# Patient Record
Sex: FEMALE | Race: WHITE | NOT HISPANIC OR LATINO | Employment: OTHER | URBAN - METROPOLITAN AREA
[De-identification: names, ages, dates, MRNs, and addresses within clinical notes are randomized per-mention and may not be internally consistent; named-entity substitution may affect disease eponyms.]

---

## 2024-05-30 ENCOUNTER — APPOINTMENT (OUTPATIENT)
Dept: CT IMAGING | Facility: HOSPITAL | Age: 79
End: 2024-05-30
Attending: EMERGENCY MEDICINE
Payer: MEDICARE

## 2024-05-30 ENCOUNTER — HOSPITAL ENCOUNTER (EMERGENCY)
Facility: HOSPITAL | Age: 79
Discharge: HOME | End: 2024-05-30
Attending: EMERGENCY MEDICINE | Admitting: EMERGENCY MEDICINE
Payer: MEDICARE

## 2024-05-30 VITALS
TEMPERATURE: 97.7 F | WEIGHT: 127.43 LBS | RESPIRATION RATE: 15 BRPM | DIASTOLIC BLOOD PRESSURE: 79 MMHG | SYSTOLIC BLOOD PRESSURE: 127 MMHG | HEART RATE: 59 BPM | OXYGEN SATURATION: 97 %

## 2024-05-30 DIAGNOSIS — E87.1 HYPONATREMIA: ICD-10-CM

## 2024-05-30 DIAGNOSIS — I48.92 PAROXYSMAL ATRIAL FLUTTER (H): ICD-10-CM

## 2024-05-30 LAB
ALBUMIN SERPL BCG-MCNC: 4.2 G/DL (ref 3.5–5.2)
ALP SERPL-CCNC: 106 U/L (ref 40–150)
ALT SERPL W P-5'-P-CCNC: 90 U/L (ref 0–50)
ANION GAP SERPL CALCULATED.3IONS-SCNC: 11 MMOL/L (ref 7–15)
AST SERPL W P-5'-P-CCNC: 95 U/L (ref 0–45)
BASOPHILS # BLD AUTO: 0 10E3/UL (ref 0–0.2)
BASOPHILS NFR BLD AUTO: 1 %
BILIRUB DIRECT SERPL-MCNC: <0.2 MG/DL (ref 0–0.3)
BILIRUB SERPL-MCNC: 0.9 MG/DL
BUN SERPL-MCNC: 17.2 MG/DL (ref 8–23)
CALCIUM SERPL-MCNC: 9.2 MG/DL (ref 8.8–10.2)
CHLORIDE SERPL-SCNC: 89 MMOL/L (ref 98–107)
CREAT SERPL-MCNC: 0.94 MG/DL (ref 0.51–0.95)
DEPRECATED HCO3 PLAS-SCNC: 24 MMOL/L (ref 22–29)
EGFRCR SERPLBLD CKD-EPI 2021: 61 ML/MIN/1.73M2
EOSINOPHIL # BLD AUTO: 0.1 10E3/UL (ref 0–0.7)
EOSINOPHIL NFR BLD AUTO: 1 %
ERYTHROCYTE [DISTWIDTH] IN BLOOD BY AUTOMATED COUNT: 14.3 % (ref 10–15)
GLUCOSE SERPL-MCNC: 129 MG/DL (ref 70–99)
HCT VFR BLD AUTO: 37.7 % (ref 35–47)
HGB BLD-MCNC: 12.7 G/DL (ref 11.7–15.7)
IMM GRANULOCYTES # BLD: 0 10E3/UL
IMM GRANULOCYTES NFR BLD: 1 %
LYMPHOCYTES # BLD AUTO: 1.3 10E3/UL (ref 0.8–5.3)
LYMPHOCYTES NFR BLD AUTO: 17 %
MAGNESIUM SERPL-MCNC: 2.1 MG/DL (ref 1.7–2.3)
MCH RBC QN AUTO: 31.6 PG (ref 26.5–33)
MCHC RBC AUTO-ENTMCNC: 33.7 G/DL (ref 31.5–36.5)
MCV RBC AUTO: 94 FL (ref 78–100)
MONOCYTES # BLD AUTO: 0.6 10E3/UL (ref 0–1.3)
MONOCYTES NFR BLD AUTO: 7 %
NEUTROPHILS # BLD AUTO: 5.7 10E3/UL (ref 1.6–8.3)
NEUTROPHILS NFR BLD AUTO: 73 %
NRBC # BLD AUTO: 0 10E3/UL
NRBC BLD AUTO-RTO: 0 /100
NT-PROBNP SERPL-MCNC: 3340 PG/ML (ref 0–1800)
PLATELET # BLD AUTO: 341 10E3/UL (ref 150–450)
POTASSIUM SERPL-SCNC: 4.8 MMOL/L (ref 3.4–5.3)
PROT SERPL-MCNC: 6.5 G/DL (ref 6.4–8.3)
RBC # BLD AUTO: 4.02 10E6/UL (ref 3.8–5.2)
SODIUM SERPL-SCNC: 124 MMOL/L (ref 135–145)
TROPONIN T SERPL HS-MCNC: 89 NG/L
TROPONIN T SERPL HS-MCNC: 94 NG/L
TSH SERPL DL<=0.005 MIU/L-ACNC: 2.44 UIU/ML (ref 0.3–4.2)
WBC # BLD AUTO: 7.7 10E3/UL (ref 4–11)

## 2024-05-30 PROCEDURE — 85025 COMPLETE CBC W/AUTO DIFF WBC: CPT | Performed by: EMERGENCY MEDICINE

## 2024-05-30 PROCEDURE — 36415 COLL VENOUS BLD VENIPUNCTURE: CPT | Performed by: EMERGENCY MEDICINE

## 2024-05-30 PROCEDURE — 83735 ASSAY OF MAGNESIUM: CPT | Performed by: EMERGENCY MEDICINE

## 2024-05-30 PROCEDURE — 96374 THER/PROPH/DIAG INJ IV PUSH: CPT | Mod: 59

## 2024-05-30 PROCEDURE — 250N000011 HC RX IP 250 OP 636: Performed by: EMERGENCY MEDICINE

## 2024-05-30 PROCEDURE — 83880 ASSAY OF NATRIURETIC PEPTIDE: CPT | Mod: GZ | Performed by: EMERGENCY MEDICINE

## 2024-05-30 PROCEDURE — 84484 ASSAY OF TROPONIN QUANT: CPT | Performed by: EMERGENCY MEDICINE

## 2024-05-30 PROCEDURE — 80053 COMPREHEN METABOLIC PANEL: CPT | Performed by: EMERGENCY MEDICINE

## 2024-05-30 PROCEDURE — 84443 ASSAY THYROID STIM HORMONE: CPT | Performed by: EMERGENCY MEDICINE

## 2024-05-30 PROCEDURE — 99285 EMERGENCY DEPT VISIT HI MDM: CPT | Mod: 25

## 2024-05-30 PROCEDURE — 93005 ELECTROCARDIOGRAM TRACING: CPT | Performed by: EMERGENCY MEDICINE

## 2024-05-30 PROCEDURE — 71275 CT ANGIOGRAPHY CHEST: CPT

## 2024-05-30 PROCEDURE — 96361 HYDRATE IV INFUSION ADD-ON: CPT

## 2024-05-30 PROCEDURE — 258N000003 HC RX IP 258 OP 636: Performed by: EMERGENCY MEDICINE

## 2024-05-30 RX ORDER — DILTIAZEM HYDROCHLORIDE 5 MG/ML
20 INJECTION INTRAVENOUS ONCE
Status: COMPLETED | OUTPATIENT
Start: 2024-05-30 | End: 2024-05-30

## 2024-05-30 RX ORDER — IOPAMIDOL 755 MG/ML
90 INJECTION, SOLUTION INTRAVASCULAR ONCE
Status: COMPLETED | OUTPATIENT
Start: 2024-05-30 | End: 2024-05-30

## 2024-05-30 RX ADMIN — SODIUM CHLORIDE 1000 ML: 9 INJECTION, SOLUTION INTRAVENOUS at 16:07

## 2024-05-30 RX ADMIN — DILTIAZEM HYDROCHLORIDE 20 MG: 5 INJECTION INTRAVENOUS at 14:22

## 2024-05-30 RX ADMIN — SODIUM CHLORIDE 500 ML: 9 INJECTION, SOLUTION INTRAVENOUS at 14:21

## 2024-05-30 RX ADMIN — SODIUM CHLORIDE 500 ML: 9 INJECTION, SOLUTION INTRAVENOUS at 15:14

## 2024-05-30 RX ADMIN — IOPAMIDOL 90 ML: 755 INJECTION, SOLUTION INTRAVENOUS at 15:04

## 2024-05-30 ASSESSMENT — COLUMBIA-SUICIDE SEVERITY RATING SCALE - C-SSRS
1. IN THE PAST MONTH, HAVE YOU WISHED YOU WERE DEAD OR WISHED YOU COULD GO TO SLEEP AND NOT WAKE UP?: NO
2. HAVE YOU ACTUALLY HAD ANY THOUGHTS OF KILLING YOURSELF IN THE PAST MONTH?: NO
6. HAVE YOU EVER DONE ANYTHING, STARTED TO DO ANYTHING, OR PREPARED TO DO ANYTHING TO END YOUR LIFE?: NO

## 2024-05-30 ASSESSMENT — ACTIVITIES OF DAILY LIVING (ADL)
ADLS_ACUITY_SCORE: 35

## 2024-05-30 NOTE — DISCHARGE INSTRUCTIONS
Avoid free water and drink electrolyte based fluids with sodium as we discussed.  Have sodium rechecked with primary clinic next week when you return to [place].  Please follow up with them and your cardiologist as well regarding atrial flutter you were in today to discuss further evaluation and treatment. Return to the ER for any confusion, seizures, recurrent symptoms you had today that do not go away after 20 minutes or so.

## 2024-05-30 NOTE — ED NOTES
Pt care and report taken from Liz QUEZADA. Pt has returned from radiology, IV fluids infusing, heart rate/rhythm is sinus bradycardia at 49bpm on the cardiac monitor. BP WNL.

## 2024-05-30 NOTE — ED TRIAGE NOTES
Patient arrives with daughter for dizziness (no spinning), feeling off, generalized weakness, fatigue upon waking around 0700. Drank some water, went back to sleep. Then up around 4143-2354. Able to dress self, Alert and oriented x4. Unable to obtain BP in triage, patient states this is not unusual.

## 2024-05-30 NOTE — ED PROVIDER NOTES
"  Emergency Department Encounter     Evaluation Date & Time:   5/30/2024  1:57 PM    CHIEF COMPLAINT:  Dizziness and Generalized Weakness      Triage Note:Patient arrives with daughter for dizziness (no spinning), feeling off, generalized weakness, fatigue upon waking around 0700. Drank some water, went back to sleep. Then up around 5265-4690. Able to dress self, Alert and oriented x4. Unable to obtain BP in triage, patient states this is not unusual.               ED COURSE & MEDICAL DECISION MAKING:     Pt here with new onset palpitations, feeling \"off\" since this morning.  Pt recently traveled here from [place] to visit daughter.  She reports increased stress related to her  having a stroke a few days ago.   Pt reports she went on a walk with daughter today, continued to feel palpitations. Denies any actual chest pain, but difficult to pin her down on what's bothering her as well, including dyspnea or lightheadedness. Given travel, new onset Aflutter/fib, will consider PE with CTA chest.  Pt reports similar palpitations a year ago, saw a cardiologist in [place] and told it was nothing to worry about.  Pt only on synthroid. Will get labs, CTA chest, hydrate, dose of IV diltiazem and monitor closely.        ED Course as of 05/30/24 2153   Thu May 30, 2024   1400 I met with the patient and performed the physical exam.    1444 Sodium 124.  Receiving NS. Will update, reassess pt.      Pt converted to NSR shortly after IV diltiazem dose.   1511 Pt remains in NSR, rate 40s-50s, /67.   1515 BNP and hsTrop slightly up, but likely all related to rate pt was in for past 12 hours or so. Will get 2 hour delta trop.   1530 CTA chest neg for PE.  Lungs clear. Will update, speak with pt/family.     1531 I rechecked and updated the patient.  Discussed with her and family results at length.  Pt returns to [place] Monday. Will have her avoid free water, focus on sodium rich fluids such as Gatorade.  Pt would like to " return home today. We did discuss anticoagulation and she wants to hold off on it for now, discuss with her doctors in [place] when she returns.  Would not start on rate control med such as metoprolol or diltiazem given HR is in the 50s right now.  Awaiting delta trop, but plan for discharge, close follow up when she returns home.  Pt and daughter understand return precautions, including discussion on hyponatremia.      Pt reports she was told previously sodium level is low and to increase salt in diet.  Uncertain how low. Either way, she will follow up closely with clinic on return to home for repeat level.   1651 Repeat trop down to 89.  Do not suspect acute occlusive disease.  Pt understands follow up, return precautions.           Medical Decision Making    History:  Supplemental history from: Family Member/Significant Other  External Record(s) reviewed: Documented in chart    Work Up:  Chart documentation includes differential considered and any EKGs or imaging independently interpreted by provider, where specified.  In additional to work up documented, I considered the following work up: Documented in chart, if applicable.    External consultation:  Discussion of management with another provider: Documented in chart, if applicable    Complicating factors:  Care impacted by chronic illness: Other: hypothyroidism  Care affected by social determinants of health: N/A    Disposition considerations: Discharge. I recommended prescription strength medication(s) anticoagulation Eliquis, but patient declined  . I considered admission, but ultimately discharged patient after discussion with pt, outpatient follow up and treatment plan.      At the conclusion of the encounter I discussed the results of all the tests and the disposition. The questions were answered. The patient or family acknowledged understanding and was agreeable with the care plan.      MEDICATIONS GIVEN IN THE EMERGENCY DEPARTMENT:  Medications   sodium  "chloride 0.9% BOLUS 500 mL (0 mLs Intravenous Stopped 5/30/24 1500)   diltiazem (CARDIZEM) injection 20 mg (20 mg Intravenous $Given 5/30/24 1422)   sodium chloride 0.9% BOLUS 500 mL (0 mLs Intravenous Stopped 5/30/24 1606)   sodium chloride 0.9% BOLUS 1,000 mL (0 mLs Intravenous Stopped 5/30/24 1704)   iopamidol (ISOVUE-370) solution 90 mL (90 mLs Intravenous $Given 5/30/24 1504)       NEW PRESCRIPTIONS STARTED AT TODAY'S ED VISIT:  There are no discharge medications for this patient.      HPI   The history is provided by the patient and a relative. No  was used.        Rosie Gaspar is a 79 year old female with a pertinent history of hypothyroidism who presents to this ED via walk-in for evaluation of dizziness and generalized weakness.    Patient reports feeling like her heart is racing starting during the night last night. She notes trying to drink water and takes electrolytes this morning without relief. Patient's daughter notes that the patient has been more fatigued than usual today and that there has been some family stress recently. Patient mentions that she gets these episodes of palpitations intermittently, but that she had been checked out by a cardiologist without diagnosis previously. Currently, she says she has improved a little but still feels \"not right\". Otherwise, patient denies any missed synthroid doses, nausea, chest pain, shortness of breath, vomiting, diarrhea, melena, cough, fever, abdominal pain, tobacco or excess alcohol use, any history of diabetes or heart issues, or taking a blood thinner. No other complaints at this time.     REVIEW OF SYSTEMS:  See HPI      Medical History   History reviewed. No pertinent past medical history.    History reviewed. No pertinent surgical history.    History reviewed. No pertinent family history.         No current outpatient medications on file.      Physical Exam     Vitals:  /79   Pulse 59   Temp 97.7  F (36.5  C) " (Temporal)   Resp 15   Wt 57.8 kg (127 lb 6.8 oz)   SpO2 97%     PHYSICAL EXAM:   Physical Exam  Vitals and nursing note reviewed.   Constitutional:       General: She is not in acute distress.     Appearance: Normal appearance.   HENT:      Head: Normocephalic and atraumatic.      Nose: Nose normal.      Mouth/Throat:      Mouth: Mucous membranes are moist.   Eyes:      Pupils: Pupils are equal, round, and reactive to light.   Neck:      Vascular: No JVD.   Cardiovascular:      Rate and Rhythm: Regular rhythm. Tachycardia present.      Pulses: Normal pulses.           Radial pulses are 2+ on the right side and 2+ on the left side.        Dorsalis pedis pulses are 2+ on the right side and 2+ on the left side.   Pulmonary:      Effort: Pulmonary effort is normal. No respiratory distress.      Breath sounds: Normal breath sounds.   Abdominal:      Palpations: Abdomen is soft.      Tenderness: There is no abdominal tenderness.   Musculoskeletal:      Cervical back: Full passive range of motion without pain and neck supple.      Comments: No calf tenderness or swelling b/l   Skin:     General: Skin is warm.      Findings: No rash.   Neurological:      General: No focal deficit present.      Mental Status: She is alert. Mental status is at baseline.      Comments: Fluent speech, no acute lateralizing deficits   Psychiatric:         Mood and Affect: Mood normal.         Behavior: Behavior normal.         Results     LAB:  All pertinent labs reviewed and interpreted  Labs Ordered and Resulted from Time of ED Arrival to Time of ED Departure   BASIC METABOLIC PANEL - Abnormal       Result Value    Sodium 124 (*)     Potassium 4.8      Chloride 89 (*)     Carbon Dioxide (CO2) 24      Anion Gap 11      Urea Nitrogen 17.2      Creatinine 0.94      GFR Estimate 61      Calcium 9.2      Glucose 129 (*)    HEPATIC FUNCTION PANEL - Abnormal    Protein Total 6.5      Albumin 4.2      Bilirubin Total 0.9      Alkaline Phosphatase  106      AST 95 (*)     ALT 90 (*)     Bilirubin Direct <0.20     TROPONIN T, HIGH SENSITIVITY - Abnormal    Troponin T, High Sensitivity 94 (*)    N TERMINAL PRO BNP OUTPATIENT - Abnormal    N Terminal Pro BNP Outpatient 3,340 (*)    TROPONIN T, HIGH SENSITIVITY - Abnormal    Troponin T, High Sensitivity 89 (*)    MAGNESIUM - Normal    Magnesium 2.1     TSH WITH FREE T4 REFLEX - Normal    TSH 2.44     CBC WITH PLATELETS AND DIFFERENTIAL    WBC Count 7.7      RBC Count 4.02      Hemoglobin 12.7      Hematocrit 37.7      MCV 94      MCH 31.6      MCHC 33.7      RDW 14.3      Platelet Count 341      % Neutrophils 73      % Lymphocytes 17      % Monocytes 7      % Eosinophils 1      % Basophils 1      % Immature Granulocytes 1      NRBCs per 100 WBC 0      Absolute Neutrophils 5.7      Absolute Lymphocytes 1.3      Absolute Monocytes 0.6      Absolute Eosinophils 0.1      Absolute Basophils 0.0      Absolute Immature Granulocytes 0.0      Absolute NRBCs 0.0         RADIOLOGY:  CT Chest Pulmonary Embolism w Contrast   Final Result   IMPRESSION:   1.  No pulmonary embolus.   2.  Clear lungs.                   ECG:  EKG 1: Aflutter vs Afib, rate 144, no acute ischemia  EKG 2: NSR, rate 61, 1st degree AV block, no acute ischemia    I have independently reviewed and interpreted the EKG(s) documented above     PROCEDURES:  Procedures:        FINAL IMPRESSION:    ICD-10-CM    1. Paroxysmal atrial flutter (H)  I48.92       2. Hyponatremia  E87.1           0 minutes of critical care time      I, Claudia Scott, am serving as a scribe to document services personally performed by Dr. Luis M Bajwa, based on my observations and the provider's statements to me. I, Luis M Bajwa, DO attest that Claudia Scott is acting in a scribe capacity, has observed my performance of the services and has documented them in accordance with my direction.      Luis M Bajwa, DO  Emergency Medicine  [facility] EMERGENCY  DEPARTMENT  5/30/2024  2:00 PM          Luis M Bajwa MD  05/30/24 1597

## 2024-05-31 LAB
ATRIAL RATE - MUSE: 61 BPM
DIASTOLIC BLOOD PRESSURE - MUSE: 49 MMHG
INTERPRETATION ECG - MUSE: NORMAL
P AXIS - MUSE: 39 DEGREES
PR INTERVAL - MUSE: 224 MS
QRS DURATION - MUSE: 90 MS
QT - MUSE: 422 MS
QTC - MUSE: 424 MS
R AXIS - MUSE: 56 DEGREES
SYSTOLIC BLOOD PRESSURE - MUSE: 77 MMHG
T AXIS - MUSE: 30 DEGREES
VENTRICULAR RATE- MUSE: 61 BPM

## 2024-06-15 ENCOUNTER — HEALTH MAINTENANCE LETTER (OUTPATIENT)
Age: 79
End: 2024-06-15

## 2025-07-06 ENCOUNTER — HEALTH MAINTENANCE LETTER (OUTPATIENT)
Age: 80
End: 2025-07-06